# Patient Record
Sex: MALE | Race: OTHER | NOT HISPANIC OR LATINO | URBAN - METROPOLITAN AREA
[De-identification: names, ages, dates, MRNs, and addresses within clinical notes are randomized per-mention and may not be internally consistent; named-entity substitution may affect disease eponyms.]

---

## 2022-12-07 ENCOUNTER — INPATIENT (INPATIENT)
Facility: HOSPITAL | Age: 27
LOS: 7 days | Discharge: NURSING, REHAB OR LONG-TERM CARE | DRG: 897 | End: 2022-12-15
Attending: STUDENT IN AN ORGANIZED HEALTH CARE EDUCATION/TRAINING PROGRAM | Admitting: STUDENT IN AN ORGANIZED HEALTH CARE EDUCATION/TRAINING PROGRAM
Payer: MEDICAID

## 2022-12-07 VITALS
RESPIRATION RATE: 19 BRPM | WEIGHT: 238.98 LBS | DIASTOLIC BLOOD PRESSURE: 79 MMHG | SYSTOLIC BLOOD PRESSURE: 147 MMHG | TEMPERATURE: 98 F | HEART RATE: 119 BPM | OXYGEN SATURATION: 98 %

## 2022-12-07 DIAGNOSIS — F10.239 ALCOHOL DEPENDENCE WITH WITHDRAWAL, UNSPECIFIED: ICD-10-CM

## 2022-12-07 LAB
ALBUMIN SERPL ELPH-MCNC: 3.4 G/DL — LOW (ref 3.5–5)
ALP SERPL-CCNC: 72 U/L — SIGNIFICANT CHANGE UP (ref 40–120)
ALT FLD-CCNC: 329 U/L DA — HIGH (ref 10–60)
ANION GAP SERPL CALC-SCNC: 12 MMOL/L — SIGNIFICANT CHANGE UP (ref 5–17)
APAP SERPL-MCNC: <2 UG/ML — LOW (ref 10–30)
AST SERPL-CCNC: 206 U/L — HIGH (ref 10–40)
BASOPHILS # BLD AUTO: 0.01 K/UL — SIGNIFICANT CHANGE UP (ref 0–0.2)
BASOPHILS NFR BLD AUTO: 0.1 % — SIGNIFICANT CHANGE UP (ref 0–2)
BILIRUB SERPL-MCNC: 1 MG/DL — SIGNIFICANT CHANGE UP (ref 0.2–1.2)
BUN SERPL-MCNC: 11 MG/DL — SIGNIFICANT CHANGE UP (ref 7–18)
CALCIUM SERPL-MCNC: 9.5 MG/DL — SIGNIFICANT CHANGE UP (ref 8.4–10.5)
CHLORIDE SERPL-SCNC: 101 MMOL/L — SIGNIFICANT CHANGE UP (ref 96–108)
CO2 SERPL-SCNC: 25 MMOL/L — SIGNIFICANT CHANGE UP (ref 22–31)
CREAT SERPL-MCNC: 0.71 MG/DL — SIGNIFICANT CHANGE UP (ref 0.5–1.3)
EGFR: 127 ML/MIN/1.73M2 — SIGNIFICANT CHANGE UP
EOSINOPHIL # BLD AUTO: 0.03 K/UL — SIGNIFICANT CHANGE UP (ref 0–0.5)
EOSINOPHIL NFR BLD AUTO: 0.3 % — SIGNIFICANT CHANGE UP (ref 0–6)
ETHANOL SERPL-MCNC: <3 MG/DL — SIGNIFICANT CHANGE UP (ref 0–10)
FLUAV AG NPH QL: SIGNIFICANT CHANGE UP
FLUBV AG NPH QL: SIGNIFICANT CHANGE UP
GLUCOSE SERPL-MCNC: 97 MG/DL — SIGNIFICANT CHANGE UP (ref 70–99)
HCT VFR BLD CALC: 42.9 % — SIGNIFICANT CHANGE UP (ref 39–50)
HGB BLD-MCNC: 14.3 G/DL — SIGNIFICANT CHANGE UP (ref 13–17)
IMM GRANULOCYTES NFR BLD AUTO: 0.4 % — SIGNIFICANT CHANGE UP (ref 0–0.9)
LYMPHOCYTES # BLD AUTO: 1.22 K/UL — SIGNIFICANT CHANGE UP (ref 1–3.3)
LYMPHOCYTES # BLD AUTO: 12.7 % — LOW (ref 13–44)
MCHC RBC-ENTMCNC: 28.9 PG — SIGNIFICANT CHANGE UP (ref 27–34)
MCHC RBC-ENTMCNC: 33.3 GM/DL — SIGNIFICANT CHANGE UP (ref 32–36)
MCV RBC AUTO: 86.8 FL — SIGNIFICANT CHANGE UP (ref 80–100)
MONOCYTES # BLD AUTO: 0.67 K/UL — SIGNIFICANT CHANGE UP (ref 0–0.9)
MONOCYTES NFR BLD AUTO: 7 % — SIGNIFICANT CHANGE UP (ref 2–14)
NEUTROPHILS # BLD AUTO: 7.6 K/UL — HIGH (ref 1.8–7.4)
NEUTROPHILS NFR BLD AUTO: 79.5 % — HIGH (ref 43–77)
NRBC # BLD: 0 /100 WBCS — SIGNIFICANT CHANGE UP (ref 0–0)
PLATELET # BLD AUTO: 175 K/UL — SIGNIFICANT CHANGE UP (ref 150–400)
POTASSIUM SERPL-MCNC: 4.2 MMOL/L — SIGNIFICANT CHANGE UP (ref 3.5–5.3)
POTASSIUM SERPL-SCNC: 4.2 MMOL/L — SIGNIFICANT CHANGE UP (ref 3.5–5.3)
PROT SERPL-MCNC: 7.8 G/DL — SIGNIFICANT CHANGE UP (ref 6–8.3)
RBC # BLD: 4.94 M/UL — SIGNIFICANT CHANGE UP (ref 4.2–5.8)
RBC # FLD: 14.5 % — SIGNIFICANT CHANGE UP (ref 10.3–14.5)
SALICYLATES SERPL-MCNC: <1.7 MG/DL — LOW (ref 2.8–20)
SARS-COV-2 RNA SPEC QL NAA+PROBE: SIGNIFICANT CHANGE UP
SODIUM SERPL-SCNC: 138 MMOL/L — SIGNIFICANT CHANGE UP (ref 135–145)
WBC # BLD: 9.57 K/UL — SIGNIFICANT CHANGE UP (ref 3.8–10.5)
WBC # FLD AUTO: 9.57 K/UL — SIGNIFICANT CHANGE UP (ref 3.8–10.5)

## 2022-12-07 PROCEDURE — 71045 X-RAY EXAM CHEST 1 VIEW: CPT | Mod: 26

## 2022-12-07 PROCEDURE — 99285 EMERGENCY DEPT VISIT HI MDM: CPT

## 2022-12-07 RX ADMIN — Medication 2 MILLIGRAM(S): at 20:18

## 2022-12-07 NOTE — ED PROVIDER NOTE - NSTIMEPROVIDERCAREINITIATE_GEN_ER
radiology results/need for outpatient follow-up/return to ED if symptoms worsen, persist or questions arise
07-Dec-2022 18:55

## 2022-12-07 NOTE — ED PROVIDER NOTE - PSYCHIATRIC, MLM
Alert and oriented to person, place, time/situation. normal mood and affect. no apparent risk to self or others. Anxious.

## 2022-12-07 NOTE — ED PROVIDER NOTE - CLINICAL SUMMARY MEDICAL DECISION MAKING FREE TEXT BOX
27 year old male with history of alcohol dependence presents to the ED with complaints of chest pain and anxiety with "shakes" today. Suspicion for alcohol withdrawal. Will obtain EKG, labs, and provide IV Ativan, then reassess.

## 2022-12-07 NOTE — ED ADULT TRIAGE NOTE - NSWEIGHTCALCTOOLDRUG_GEN_A_CORE
81 y/o M with PMH of prostate cancer s/p seed implant, Hypertension, Type 2 diabetes on insulin (has not been taking insulin recently), CAD s/p stent (last stent placed 2 years ago), CVA s/p carotid stent 1 month ago, JACKELIN on CPAP, presents to the ED complaining of altered mental status and dysuria.   used

## 2022-12-07 NOTE — ED PROVIDER NOTE - ENMT, MLM
Airway patent, Nasal mucosa clear. Mouth with normal mucosa. Throat has no vesicles, no oropharyngeal exudates and uvula is midline. Tongue fasciculations.

## 2022-12-07 NOTE — ED PROVIDER NOTE - OBJECTIVE STATEMENT
27 year old male with history of alcohol dependence presents to the ED with complaints of chest pain and anxiety with "shakes" today. The patient reports that his last drink was one day ago. The patient notes that he had experienced withdrawal before. The patient denies drug use, and any other symptoms including suicidal or homicidal ideation, shortness of breath, vomiting, and diarrhea. NKDA.

## 2022-12-08 DIAGNOSIS — R74.01 ELEVATION OF LEVELS OF LIVER TRANSAMINASE LEVELS: ICD-10-CM

## 2022-12-08 DIAGNOSIS — Z29.9 ENCOUNTER FOR PROPHYLACTIC MEASURES, UNSPECIFIED: ICD-10-CM

## 2022-12-08 DIAGNOSIS — F10.10 ALCOHOL ABUSE, UNCOMPLICATED: ICD-10-CM

## 2022-12-08 LAB
A1C WITH ESTIMATED AVERAGE GLUCOSE RESULT: 5 % — SIGNIFICANT CHANGE UP (ref 4–5.6)
AMPHET UR-MCNC: NEGATIVE — SIGNIFICANT CHANGE UP
ANION GAP SERPL CALC-SCNC: 13 MMOL/L — SIGNIFICANT CHANGE UP (ref 5–17)
APPEARANCE UR: CLEAR — SIGNIFICANT CHANGE UP
BARBITURATES UR SCN-MCNC: NEGATIVE — SIGNIFICANT CHANGE UP
BENZODIAZ UR-MCNC: NEGATIVE — SIGNIFICANT CHANGE UP
BILIRUB UR-MCNC: NEGATIVE — SIGNIFICANT CHANGE UP
BUN SERPL-MCNC: 9 MG/DL — SIGNIFICANT CHANGE UP (ref 7–18)
CALCIUM SERPL-MCNC: 9.2 MG/DL — SIGNIFICANT CHANGE UP (ref 8.4–10.5)
CHLORIDE SERPL-SCNC: 104 MMOL/L — SIGNIFICANT CHANGE UP (ref 96–108)
CHOLEST SERPL-MCNC: 183 MG/DL — SIGNIFICANT CHANGE UP
CO2 SERPL-SCNC: 24 MMOL/L — SIGNIFICANT CHANGE UP (ref 22–31)
COCAINE METAB.OTHER UR-MCNC: NEGATIVE — SIGNIFICANT CHANGE UP
COLOR SPEC: YELLOW — SIGNIFICANT CHANGE UP
CREAT SERPL-MCNC: 0.59 MG/DL — SIGNIFICANT CHANGE UP (ref 0.5–1.3)
DIFF PNL FLD: NEGATIVE — SIGNIFICANT CHANGE UP
EGFR: 135 ML/MIN/1.73M2 — SIGNIFICANT CHANGE UP
ESTIMATED AVERAGE GLUCOSE: 97 MG/DL — SIGNIFICANT CHANGE UP (ref 68–114)
GLUCOSE SERPL-MCNC: 86 MG/DL — SIGNIFICANT CHANGE UP (ref 70–99)
GLUCOSE UR QL: NEGATIVE — SIGNIFICANT CHANGE UP
HCT VFR BLD CALC: 40.8 % — SIGNIFICANT CHANGE UP (ref 39–50)
HDLC SERPL-MCNC: 52 MG/DL — SIGNIFICANT CHANGE UP
HGB BLD-MCNC: 13.9 G/DL — SIGNIFICANT CHANGE UP (ref 13–17)
KETONES UR-MCNC: ABNORMAL
LEUKOCYTE ESTERASE UR-ACNC: NEGATIVE — SIGNIFICANT CHANGE UP
LIDOCAIN IGE QN: 273 U/L — SIGNIFICANT CHANGE UP (ref 73–393)
LIPID PNL WITH DIRECT LDL SERPL: 115 MG/DL — HIGH
MCHC RBC-ENTMCNC: 29.5 PG — SIGNIFICANT CHANGE UP (ref 27–34)
MCHC RBC-ENTMCNC: 34.1 GM/DL — SIGNIFICANT CHANGE UP (ref 32–36)
MCV RBC AUTO: 86.6 FL — SIGNIFICANT CHANGE UP (ref 80–100)
METHADONE UR-MCNC: NEGATIVE — SIGNIFICANT CHANGE UP
NITRITE UR-MCNC: NEGATIVE — SIGNIFICANT CHANGE UP
NON HDL CHOLESTEROL: 131 MG/DL — HIGH
NRBC # BLD: 0 /100 WBCS — SIGNIFICANT CHANGE UP (ref 0–0)
OPIATES UR-MCNC: NEGATIVE — SIGNIFICANT CHANGE UP
PCP SPEC-MCNC: SIGNIFICANT CHANGE UP
PCP UR-MCNC: NEGATIVE — SIGNIFICANT CHANGE UP
PH UR: 6 — SIGNIFICANT CHANGE UP (ref 5–8)
PLATELET # BLD AUTO: 156 K/UL — SIGNIFICANT CHANGE UP (ref 150–400)
POTASSIUM SERPL-MCNC: 3.7 MMOL/L — SIGNIFICANT CHANGE UP (ref 3.5–5.3)
POTASSIUM SERPL-SCNC: 3.7 MMOL/L — SIGNIFICANT CHANGE UP (ref 3.5–5.3)
PROT UR-MCNC: NEGATIVE — SIGNIFICANT CHANGE UP
RBC # BLD: 4.71 M/UL — SIGNIFICANT CHANGE UP (ref 4.2–5.8)
RBC # FLD: 14.4 % — SIGNIFICANT CHANGE UP (ref 10.3–14.5)
SODIUM SERPL-SCNC: 141 MMOL/L — SIGNIFICANT CHANGE UP (ref 135–145)
SP GR SPEC: 1.01 — SIGNIFICANT CHANGE UP (ref 1.01–1.02)
THC UR QL: NEGATIVE — SIGNIFICANT CHANGE UP
TRIGL SERPL-MCNC: 79 MG/DL — SIGNIFICANT CHANGE UP
UROBILINOGEN FLD QL: NEGATIVE — SIGNIFICANT CHANGE UP
WBC # BLD: 6.11 K/UL — SIGNIFICANT CHANGE UP (ref 3.8–10.5)
WBC # FLD AUTO: 6.11 K/UL — SIGNIFICANT CHANGE UP (ref 3.8–10.5)

## 2022-12-08 PROCEDURE — 99223 1ST HOSP IP/OBS HIGH 75: CPT | Mod: GC

## 2022-12-08 PROCEDURE — 76705 ECHO EXAM OF ABDOMEN: CPT | Mod: 26

## 2022-12-08 RX ORDER — ACETAMINOPHEN 500 MG
650 TABLET ORAL EVERY 6 HOURS
Refills: 0 | Status: DISCONTINUED | OUTPATIENT
Start: 2022-12-08 | End: 2022-12-15

## 2022-12-08 RX ORDER — FOLIC ACID 0.8 MG
1 TABLET ORAL DAILY
Refills: 0 | Status: DISCONTINUED | OUTPATIENT
Start: 2022-12-08 | End: 2022-12-15

## 2022-12-08 RX ORDER — ONDANSETRON 8 MG/1
4 TABLET, FILM COATED ORAL EVERY 8 HOURS
Refills: 0 | Status: DISCONTINUED | OUTPATIENT
Start: 2022-12-08 | End: 2022-12-15

## 2022-12-08 RX ORDER — LANOLIN ALCOHOL/MO/W.PET/CERES
3 CREAM (GRAM) TOPICAL AT BEDTIME
Refills: 0 | Status: DISCONTINUED | OUTPATIENT
Start: 2022-12-08 | End: 2022-12-15

## 2022-12-08 RX ORDER — THIAMINE MONONITRATE (VIT B1) 100 MG
500 TABLET ORAL EVERY 8 HOURS
Refills: 0 | Status: COMPLETED | OUTPATIENT
Start: 2022-12-08 | End: 2022-12-10

## 2022-12-08 RX ADMIN — Medication 105 MILLIGRAM(S): at 05:02

## 2022-12-08 RX ADMIN — Medication 2 MILLIGRAM(S): at 11:15

## 2022-12-08 RX ADMIN — Medication 1 TABLET(S): at 11:14

## 2022-12-08 RX ADMIN — Medication 105 MILLIGRAM(S): at 22:01

## 2022-12-08 RX ADMIN — Medication 1 MILLIGRAM(S): at 11:14

## 2022-12-08 RX ADMIN — Medication 105 MILLIGRAM(S): at 15:42

## 2022-12-08 NOTE — PATIENT PROFILE ADULT - FALL HARM RISK - HARM RISK INTERVENTIONS
Assistance with ambulation/Assistance OOB with selected safe patient handling equipment/Communicate Risk of Fall with Harm to all staff/Monitor for mental status changes/Monitor gait and stability/Reinforce activity limits and safety measures with patient and family/Tailored Fall Risk Interventions/Toileting schedule using arm’s reach rule for commode and bathroom/Use of alarms - bed, chair and/or voice tab/Visual Cue: Yellow wristband and red socks/Bed in lowest position, wheels locked, appropriate side rails in place/Call bell, personal items and telephone in reach/Instruct patient to call for assistance before getting out of bed or chair/Non-slip footwear when patient is out of bed/[place] to call system/Physically safe environment - no spills, clutter or unnecessary equipment/Purposeful Proactive Rounding/Room/bathroom lighting operational, light cord in reach

## 2022-12-08 NOTE — H&P ADULT - HISTORY OF PRESENT ILLNESS
This is a 27 year old male, coming from home, with medical history of alcohol abuse coming in for alcohol intoxication. Pt  This is a 27 year old male, coming from home, with medical history of alcohol abuse coming in for alcohol intoxication. Pt presented to the ED with complaints of chest pain, anxiety and shaking. Pt states these symptoms feel similar to the last time he went into withdrawl from alchol use. His last drink was over 24 hours ago, he is unable to quantify how much he drinks daily. Unable to obtain more information as pt was given Ativan and was drowsy.

## 2022-12-08 NOTE — H&P ADULT - ASSESSMENT
This is a 27 year old male, coming from home, with medical history of alcohol abuse coming in for alcohol intoxication.

## 2022-12-08 NOTE — H&P ADULT - ATTENDING COMMENTS
Vital Signs Last 24 Hrs  T(C): 36.7 (08 Dec 2022 00:00), Max: 37.2 (07 Dec 2022 21:17)  T(F): 98.1 (08 Dec 2022 00:00), Max: 98.9 (07 Dec 2022 21:17)  HR: 111 (08 Dec 2022 00:00) (106 - 119)  BP: 126/79 (08 Dec 2022 00:00) (107/71 - 147/79)  BP(mean): --  RR: 18 (08 Dec 2022 00:00) (18 - 19)  SpO2: 99% (08 Dec 2022 00:00) (98% - 99%)    Parameters below as of 08 Dec 2022 00:00  Patient On (Oxygen Delivery Method): room air    Labs and imaging studies noted    Assessment and plan: Patient is a 28 yo male, Luis speaking, from home, with PMH of alcohol abuse coming in for alcohol intoxication. Pt presented to the ED with complaints of chest pain, anxiety and shaking. Pt states these symptoms feel similar to the last time he went into withdrawal from alchol use. His last drink was over 2 days ago, reports that he drinks heavily but has been trying to cut etoh intake since last 1 week. Reports being hospitalized before for etoh withdrawal. Never been in etoh rehab program, but is interested to participate as he is willing to quit alcohol.      Vital Signs Last 24 Hrs  T(C): 36.7 (08 Dec 2022 00:00), Max: 37.2 (07 Dec 2022 21:17)  T(F): 98.1 (08 Dec 2022 00:00), Max: 98.9 (07 Dec 2022 21:17)  HR: 111 (08 Dec 2022 00:00) (106 - 119)  BP: 126/79 (08 Dec 2022 00:00) (107/71 - 147/79)  BP(mean): --  RR: 18 (08 Dec 2022 00:00) (18 - 19)  SpO2: 99% (08 Dec 2022 00:00) (98% - 99%)  Parameters below as of 08 Dec 2022 00:00  Patient On (Oxygen Delivery Method): room air  AAOx3, NAD, mild hand tremors    Labs and imaging studies noted    Assessment and plan: Patient is a 26 yo male, Luis speaking, from home, with PMH of alcohol abuse coming in for alcohol intoxication. Pt presented to the ED with complaints of chest pain, anxiety and shaking. Pt states these symptoms feel similar to the last time he went into withdrawal from alchol use. His last drink was over 2 days ago, reports that he drinks heavily but has been trying to cut etoh intake since last 1 week. Reports being hospitalized before for etoh withdrawal. Never been in etoh rehab program, but is interested to participate as he is willing to quit alcohol.      Vital Signs Last 24 Hrs  T(C): 36.7 (08 Dec 2022 00:00), Max: 37.2 (07 Dec 2022 21:17)  T(F): 98.1 (08 Dec 2022 00:00), Max: 98.9 (07 Dec 2022 21:17)  HR: 111 (08 Dec 2022 00:00) (106 - 119)  BP: 126/79 (08 Dec 2022 00:00) (107/71 - 147/79)  BP(mean): --  RR: 18 (08 Dec 2022 00:00) (18 - 19)  SpO2: 99% (08 Dec 2022 00:00) (98% - 99%)  Parameters below as of 08 Dec 2022 00:00  Patient On (Oxygen Delivery Method): room air  AAOx3, NAD, mild hand tremors on extension  chest soft, nt, nd  no FND  no rash, lesion    Labs and imaging studies noted    Assessment and plan: Patient is a 28 yo male, Luis speaking, from home, with PMH of alcohol abuse coming in for alcohol intoxication. Pt presented to the ED with complaints of chest pain, anxiety and shaking. Pt states these symptoms feel similar to the last time he went into withdrawal from alchol use. His last drink was over 2 days ago, reports that he drinks heavily but has been trying to cut etoh intake since last 1 week. Reports being hospitalized before for etoh withdrawal. Never been in etoh rehab program, but is interested to participate as he is willing to quit alcohol.      Vital Signs Last 24 Hrs  T(C): 36.7 (08 Dec 2022 00:00), Max: 37.2 (07 Dec 2022 21:17)  T(F): 98.1 (08 Dec 2022 00:00), Max: 98.9 (07 Dec 2022 21:17)  HR: 111 (08 Dec 2022 00:00) (106 - 119)  BP: 126/79 (08 Dec 2022 00:00) (107/71 - 147/79)  BP(mean): --  RR: 18 (08 Dec 2022 00:00) (18 - 19)  SpO2: 99% (08 Dec 2022 00:00) (98% - 99%)  Parameters below as of 08 Dec 2022 00:00  Patient On (Oxygen Delivery Method): room air  AAOx3, NAD, mild hand tremors on extension  chest soft, nt, nd  no FND  no rash, lesion    Labs and imaging studies noted  BAL< 3, AST//329  s/p 2 mg iv ativan in ED    Assessment and plan: 28 yo male, Luis speaking, from home, with PMH of alcohol abuse coming in for alcohol intoxication/withdrawal.     Etoh abuse/withdrawal  Transaminitis    - p/e anxiety, chest pain, shaking, CIWA 14 in ED, s/p iv ativan 2 mg  - last drink 2 days ago, reports he has been trying to cut alcohol last1 week  - usually drinks 1/2 bottle of whiskey/ day.  - BAL <3, transaminitis likely ETOH induced  - reports burning epigastric pain, likely etoh induced gastritis  - CIWA protocol, Ativan 2mg q4 standing + 2mg q2 PRN, iv thiamine folate replacement  - po pantoprazole  - RUQ US abdomen  - patient interested in etoh rehab program  - SW consult

## 2022-12-08 NOTE — H&P ADULT - NSHPREVIEWOFSYSTEMS_GEN_ALL_CORE
CONSTITUTIONAL: No fever, weight loss, or fatigue  RESPIRATORY: No cough, wheezing, chills or hemoptysis; No shortness of breath  CARDIOVASCULAR: No chest pain, palpitations, dizziness, or leg swelling  GASTROINTESTINAL: No abdominal pain. No nausea, vomiting, or hematemesis; No diarrhea or constipation. No melena or hematochezia.  GENITOURINARY: No dysuria or hematuria, urinary frequency  NEUROLOGICAL: No headaches, memory loss, loss of strength, numbness, or tremors  ENDOCRINE: No polyuria, polydipsia, or heat/cold intolerance  MUSCULOSKELETAL: No muscle aches, joint pains  HEME: no easy bruisability, no tender or enlarged lymph nodes  SKIN: No itching, burning, rashes, or lesions . CONSTITUTIONAL: No fever, weight loss, or fatigue  RESPIRATORY: No cough, wheezing, chills or hemoptysis; No shortness of breath  CARDIOVASCULAR: No chest pain, palpitations, dizziness, or leg swelling  GASTROINTESTINAL: No abdominal pain. No nausea, vomiting, or hematemesis; No diarrhea or constipation. No melena or hematochezia.  GENITOURINARY: No dysuria or hematuria, urinary frequency  NEUROLOGICAL: Anxiety  ENDOCRINE: No polyuria, polydipsia, or heat/cold intolerance  MUSCULOSKELETAL: No muscle aches, joint pains  HEME: no easy bruisability, no tender or enlarged lymph nodes  SKIN: No itching, burning, rashes, or lesions .

## 2022-12-08 NOTE — H&P ADULT - NSHPPHYSICALEXAM_GEN_ALL_CORE
Vital Signs Last 24 Hrs  T(C): 37.2 (07 Dec 2022 21:17), Max: 37.2 (07 Dec 2022 21:17)  T(F): 98.9 (07 Dec 2022 21:17), Max: 98.9 (07 Dec 2022 21:17)  HR: 106 (07 Dec 2022 21:17) (106 - 119)  BP: 107/71 (07 Dec 2022 21:17) (107/71 - 147/79)  BP(mean): --  RR: 18 (07 Dec 2022 21:17) (18 - 19)  SpO2: 99% (07 Dec 2022 21:17) (98% - 99%)    Parameters below as of 07 Dec 2022 21:17  Patient On (Oxygen Delivery Method): room air    PHYSICAL EXAM:  GENERAL: NAD, speaks in full sentences, no signs of respiratory distress  HEAD:  Atraumatic, Normocephalic  EYES: EOMI, PERRLA, conjunctiva and sclera clear  NECK: Supple, No JVD  CHEST/LUNG: Clear to auscultation bilaterally; No wheeze; No crackles; No accessory muscles used  HEART: Regular rate and rhythm; No murmurs;   ABDOMEN: Soft, Nontender, Nondistended; Bowel sounds present; No guarding  EXTREMITIES:  2+ Peripheral Pulses, No cyanosis or edema  PSYCH: AAOx3  NEUROLOGY: non-focal  SKIN: No rashes or lesions Vital Signs Last 24 Hrs  T(C): 37.2 (07 Dec 2022 21:17), Max: 37.2 (07 Dec 2022 21:17)  T(F): 98.9 (07 Dec 2022 21:17), Max: 98.9 (07 Dec 2022 21:17)  HR: 106 (07 Dec 2022 21:17) (106 - 119)  BP: 107/71 (07 Dec 2022 21:17) (107/71 - 147/79)  BP(mean): --  RR: 18 (07 Dec 2022 21:17) (18 - 19)  SpO2: 99% (07 Dec 2022 21:17) (98% - 99%)    Parameters below as of 07 Dec 2022 21:17  Patient On (Oxygen Delivery Method): room air    PHYSICAL EXAM:  GENERAL: Drowsy  HEAD:  Atraumatic, Normocephalic  EYES: EOMI, PERRLA, conjunctiva and sclera clear  NECK: Supple, No JVD  CHEST/LUNG: Clear to auscultation bilaterally; No wheeze; No crackles; No accessory muscles used  HEART: Regular rate and rhythm; No murmurs;   ABDOMEN: Soft, Nontender, Nondistended; Bowel sounds present; No guarding  EXTREMITIES:  2+ Peripheral Pulses, No cyanosis or edema  PSYCH: AAOx3  NEUROLOGY: non-focal  SKIN: No rashes or lesions

## 2022-12-08 NOTE — H&P ADULT - PROBLEM SELECTOR PLAN 1
Pt drinks ____  daily  BAL ____ on admission  Start CIWA Protocol  Ativan 2mg q4 standing + 2mg q2 PRN for CIWA >7  Thiamine 500mg q8 x3 days, multivitamin, b12 qd, b9 qd  SBIRT Pt drinks daily  BAL <3 on admission  Start CIWA Protocol  Ativan 2mg q4 standing + 2mg q2 PRN for CIWA >7  Thiamine 500mg q8 x3 days, multivitamin, b12 qd, b9 qd  SBIRT

## 2022-12-09 DIAGNOSIS — Z02.9 ENCOUNTER FOR ADMINISTRATIVE EXAMINATIONS, UNSPECIFIED: ICD-10-CM

## 2022-12-09 PROCEDURE — 99233 SBSQ HOSP IP/OBS HIGH 50: CPT

## 2022-12-09 RX ORDER — SIMETHICONE 80 MG/1
80 TABLET, CHEWABLE ORAL THREE TIMES A DAY
Refills: 0 | Status: DISCONTINUED | OUTPATIENT
Start: 2022-12-09 | End: 2022-12-15

## 2022-12-09 RX ADMIN — Medication 105 MILLIGRAM(S): at 23:13

## 2022-12-09 RX ADMIN — SIMETHICONE 80 MILLIGRAM(S): 80 TABLET, CHEWABLE ORAL at 14:10

## 2022-12-09 RX ADMIN — Medication 1 MILLIGRAM(S): at 13:19

## 2022-12-09 RX ADMIN — Medication 105 MILLIGRAM(S): at 14:10

## 2022-12-09 RX ADMIN — Medication 105 MILLIGRAM(S): at 05:54

## 2022-12-09 RX ADMIN — Medication 1 TABLET(S): at 13:19

## 2022-12-09 NOTE — PROGRESS NOTE ADULT - SUBJECTIVE AND OBJECTIVE BOX
NP Note discussed with  Primary Attending    Patient is a 27y old  Male who presents with a chief complaint of Alcohol intoxication (08 Dec 2022 00:17)      INTERVAL HPI/OVERNIGHT EVENTS: no new complaints    MEDICATIONS  (STANDING):  folic acid 1 milliGRAM(s) Oral daily  multivitamin 1 Tablet(s) Oral daily  thiamine IVPB 500 milliGRAM(s) IV Intermittent every 8 hours    MEDICATIONS  (PRN):  acetaminophen     Tablet .. 650 milliGRAM(s) Oral every 6 hours PRN Temp greater or equal to 38C (100.4F), Mild Pain (1 - 3)  aluminum hydroxide/magnesium hydroxide/simethicone Suspension 30 milliLiter(s) Oral every 4 hours PRN Dyspepsia  LORazepam     Tablet 2 milliGRAM(s) Oral every 4 hours PRN CIWA-Ar score 8 or greater  LORazepam   Injectable 2 milliGRAM(s) IV Push every 2 hours PRN CIWA-Ar score increase by 2 points and a total score of 7 or less  melatonin 3 milliGRAM(s) Oral at bedtime PRN Insomnia  ondansetron Injectable 4 milliGRAM(s) IV Push every 8 hours PRN Nausea and/or Vomiting  simethicone 80 milliGRAM(s) Chew three times a day PRN Gas      __________________________________________________  REVIEW OF SYSTEMS:    CONSTITUTIONAL: No fever,   EYES: no acute visual disturbances  NECK: No pain or stiffness  RESPIRATORY: No cough; No shortness of breath  CARDIOVASCULAR: No chest pain, no palpitations  GASTROINTESTINAL: No pain. No nausea or vomiting; No diarrhea   NEUROLOGICAL: No headache or numbness, no tremors  MUSCULOSKELETAL: No joint pain, no muscle pain  GENITOURINARY: no dysuria, no frequency, no hesitancy  PSYCHIATRY: no depression , no anxiety  ALL OTHER  ROS negative        Vital Signs Last 24 Hrs  T(C): 37.2 (09 Dec 2022 14:00), Max: 37.2 (09 Dec 2022 14:00)  T(F): 98.9 (09 Dec 2022 14:00), Max: 98.9 (09 Dec 2022 14:00)  HR: 108 (09 Dec 2022 14:00) (89 - 114)  BP: 145/84 (09 Dec 2022 14:00) (116/71 - 145/84)  BP(mean): 99 (09 Dec 2022 14:00) (99 - 99)  RR: 18 (09 Dec 2022 14:00) (18 - 18)  SpO2: 98% (09 Dec 2022 14:00) (98% - 100%)    Parameters below as of 09 Dec 2022 14:00  Patient On (Oxygen Delivery Method): room air        ________________________________________________  PHYSICAL EXAM:  GENERAL: NAD  HEENT: Normocephalic;  conjunctivae and sclerae clear; moist mucous membranes;   NECK : supple  CHEST/LUNG: Clear to auscultation bilaterally with good air entry   HEART: S1 S2  regular; no murmurs, gallops or rubs  ABDOMEN: Soft, Nontender, Nondistended; Bowel sounds present  EXTREMITIES: no cyanosis; no edema; no calf tenderness  SKIN: warm and dry; no rash  NERVOUS SYSTEM:  Awake and alert; Oriented  to place, person and time ; no new deficits    _________________________________________________  LABS:                        13.9   6.11  )-----------( 156      ( 08 Dec 2022 05:40 )             40.8     12-    141  |  104  |  9   ----------------------------<  86  3.7   |  24  |  0.59    Ca    9.2      08 Dec 2022 05:40    TPro  7.8  /  Alb  3.4<L>  /  TBili  1.0  /  DBili  x   /  AST  206<H>  /  ALT  329<H>  /  AlkPhos  72  12-07      Urinalysis Basic - ( 08 Dec 2022 20:50 )    Color: Yellow / Appearance: Clear / S.015 / pH: x  Gluc: x / Ketone: Trace  / Bili: Negative / Urobili: Negative   Blood: x / Protein: Negative / Nitrite: Negative   Leuk Esterase: Negative / RBC: x / WBC x   Sq Epi: x / Non Sq Epi: x / Bacteria: x      CAPILLARY BLOOD GLUCOSE            RADIOLOGY & ADDITIONAL TESTS:    Imaging Personally Reviewed:  YES/NO    Consultant(s) Notes Reviewed:   YES/ No    Care Discussed with Consultants :     Plan of care was discussed with patient and /or primary care giver; all questions and concerns were addressed and care was aligned with patient's wishes.     NP Note discussed with  Primary Attending    Patient is a 27y old  Male who presents with a chief complaint of Alcohol intoxication (08 Dec 2022 00:17)      INTERVAL HPI/OVERNIGHT EVENTS: no acute events overnight    MEDICATIONS  (STANDING):  folic acid 1 milliGRAM(s) Oral daily  multivitamin 1 Tablet(s) Oral daily  thiamine IVPB 500 milliGRAM(s) IV Intermittent every 8 hours    MEDICATIONS  (PRN):  acetaminophen     Tablet .. 650 milliGRAM(s) Oral every 6 hours PRN Temp greater or equal to 38C (100.4F), Mild Pain (1 - 3)  aluminum hydroxide/magnesium hydroxide/simethicone Suspension 30 milliLiter(s) Oral every 4 hours PRN Dyspepsia  LORazepam     Tablet 2 milliGRAM(s) Oral every 4 hours PRN CIWA-Ar score 8 or greater  LORazepam   Injectable 2 milliGRAM(s) IV Push every 2 hours PRN CIWA-Ar score increase by 2 points and a total score of 7 or less  melatonin 3 milliGRAM(s) Oral at bedtime PRN Insomnia  ondansetron Injectable 4 milliGRAM(s) IV Push every 8 hours PRN Nausea and/or Vomiting  simethicone 80 milliGRAM(s) Chew three times a day PRN Gas      __________________________________________________  REVIEW OF SYSTEMS:    CONSTITUTIONAL: No fever,   EYES: no acute visual disturbances  NECK: No pain or stiffness  RESPIRATORY: No cough; No shortness of breath  CARDIOVASCULAR: No chest pain, no palpitations  GASTROINTESTINAL: No pain. No nausea or vomiting; No diarrhea   NEUROLOGICAL: No headache or numbness, no tremors  MUSCULOSKELETAL: No joint pain, no muscle pain  GENITOURINARY: no dysuria, no frequency, no hesitancy  PSYCHIATRY: no depression , no anxiety  ALL OTHER  ROS negative        Vital Signs Last 24 Hrs  T(C): 37.2 (09 Dec 2022 14:00), Max: 37.2 (09 Dec 2022 14:00)  T(F): 98.9 (09 Dec 2022 14:00), Max: 98.9 (09 Dec 2022 14:00)  HR: 108 (09 Dec 2022 14:00) (89 - 114)  BP: 145/84 (09 Dec 2022 14:00) (116/71 - 145/84)  BP(mean): 99 (09 Dec 2022 14:00) (99 - 99)  RR: 18 (09 Dec 2022 14:00) (18 - 18)  SpO2: 98% (09 Dec 2022 14:00) (98% - 100%)    Parameters below as of 09 Dec 2022 14:00  Patient On (Oxygen Delivery Method): room air        ________________________________________________  PHYSICAL EXAM:  GENERAL: NAD  HEENT: Normocephalic;  conjunctivae and sclerae clear; moist mucous membranes;   NECK : supple  CHEST/LUNG: Clear to auscultation bilaterally with good air entry   HEART: S1 S2  regular; no murmurs, gallops or rubs  ABDOMEN: Soft, Nontender, Nondistended; Bowel sounds present  EXTREMITIES: no cyanosis; no edema; no calf tenderness  SKIN: warm and dry; no rash  NERVOUS SYSTEM:  Awake and alert; Oriented  to place, person and time ; no new deficits    _________________________________________________  LABS:                        13.9   6.11  )-----------( 156      ( 08 Dec 2022 05:40 )             40.8     12    141  |  104  |  9   ----------------------------<  86  3.7   |  24  |  0.59    Ca    9.2      08 Dec 2022 05:40    TPro  7.8  /  Alb  3.4<L>  /  TBili  1.0  /  DBili  x   /  AST  206<H>  /  ALT  329<H>  /  AlkPhos  72  12-07      Urinalysis Basic - ( 08 Dec 2022 20:50 )    Color: Yellow / Appearance: Clear / S.015 / pH: x  Gluc: x / Ketone: Trace  / Bili: Negative / Urobili: Negative   Blood: x / Protein: Negative / Nitrite: Negative   Leuk Esterase: Negative / RBC: x / WBC x   Sq Epi: x / Non Sq Epi: x / Bacteria: x      CAPILLARY BLOOD GLUCOSE            RADIOLOGY & ADDITIONAL TESTS:    Imaging Personally Reviewed:  YES/NO    Consultant(s) Notes Reviewed:   YES/ No    Care Discussed with Consultants :     Plan of care was discussed with patient and /or primary care giver; all questions and concerns were addressed and care was aligned with patient's wishes.     NP Note discussed with  Primary Attending    Patient is a 27y old  Male who presents with a chief complaint of Alcohol intoxication (08 Dec 2022 00:17)      INTERVAL HPI/OVERNIGHT EVENTS: no acute events overnight    MEDICATIONS  (STANDING):  folic acid 1 milliGRAM(s) Oral daily  multivitamin 1 Tablet(s) Oral daily  thiamine IVPB 500 milliGRAM(s) IV Intermittent every 8 hours    MEDICATIONS  (PRN):  acetaminophen     Tablet .. 650 milliGRAM(s) Oral every 6 hours PRN Temp greater or equal to 38C (100.4F), Mild Pain (1 - 3)  aluminum hydroxide/magnesium hydroxide/simethicone Suspension 30 milliLiter(s) Oral every 4 hours PRN Dyspepsia  LORazepam     Tablet 2 milliGRAM(s) Oral every 4 hours PRN CIWA-Ar score 8 or greater  LORazepam   Injectable 2 milliGRAM(s) IV Push every 2 hours PRN CIWA-Ar score increase by 2 points and a total score of 7 or less  melatonin 3 milliGRAM(s) Oral at bedtime PRN Insomnia  ondansetron Injectable 4 milliGRAM(s) IV Push every 8 hours PRN Nausea and/or Vomiting  simethicone 80 milliGRAM(s) Chew three times a day PRN Gas      __________________________________________________  REVIEW OF SYSTEMS:    CONSTITUTIONAL: No fever,   EYES: no acute visual disturbances  NECK: No pain or stiffness  RESPIRATORY: No cough; No shortness of breath  CARDIOVASCULAR: No chest pain, no palpitations  GASTROINTESTINAL: No pain. No nausea or vomiting; No diarrhea   NEUROLOGICAL: No headache or numbness, no tremors  MUSCULOSKELETAL: No joint pain, no muscle pain  GENITOURINARY: no dysuria, no frequency, no hesitancy  PSYCHIATRY: no depression , no anxiety  ALL OTHER  ROS negative        Vital Signs Last 24 Hrs  T(C): 37.2 (09 Dec 2022 14:00), Max: 37.2 (09 Dec 2022 14:00)  T(F): 98.9 (09 Dec 2022 14:00), Max: 98.9 (09 Dec 2022 14:00)  HR: 108 (09 Dec 2022 14:00) (89 - 114)  BP: 145/84 (09 Dec 2022 14:00) (116/71 - 145/84)  BP(mean): 99 (09 Dec 2022 14:00) (99 - 99)  RR: 18 (09 Dec 2022 14:00) (18 - 18)  SpO2: 98% (09 Dec 2022 14:00) (98% - 100%)    Parameters below as of 09 Dec 2022 14:00  Patient On (Oxygen Delivery Method): room air        ________________________________________________  PHYSICAL EXAM:  GENERAL: NAD; + nhan speaking  HEENT: Normocephalic;  conjunctivae and sclerae clear  NECK : supple  CHEST/LUNG: Clear to auscultation bilaterally  HEART: S1 S2  RRR  ABDOMEN: Soft, Nontender, Nondistended; Bowel sounds present  EXTREMITIES: no cyanosis; no edema  SKIN: warm and dry; no rash  NERVOUS SYSTEM:  Awake and alert; Oriented  to place, person and time    _________________________________________________  LABS:                        13.9   6.11  )-----------( 156      ( 08 Dec 2022 05:40 )             40.8     12-    141  |  104  |  9   ----------------------------<  86  3.7   |  24  |  0.59    Ca    9.2      08 Dec 2022 05:40    TPro  7.8  /  Alb  3.4<L>  /  TBili  1.0  /  DBili  x   /  AST  206<H>  /  ALT  329<H>  /  AlkPhos  72  1207      Urinalysis Basic - ( 08 Dec 2022 20:50 )    Color: Yellow / Appearance: Clear / S.015 / pH: x  Gluc: x / Ketone: Trace  / Bili: Negative / Urobili: Negative   Blood: x / Protein: Negative / Nitrite: Negative   Leuk Esterase: Negative / RBC: x / WBC x   Sq Epi: x / Non Sq Epi: x / Bacteria: x      CAPILLARY BLOOD GLUCOSE            RADIOLOGY & ADDITIONAL TESTS:  < from: US Abdomen Upper Quadrant Right (22 @ 12:29) >  IMPRESSION:  Fatty infiltration of the liver    < end of copied text >      < from: Xray Chest 1 View- PORTABLE-Urgent (22 @ 21:38) >  IMPRESSION:   No radiographic evidence of active chest disease.    < end of copied text >      Imaging Personally Reviewed:  YES/NO    Consultant(s) Notes Reviewed:   YES/ No    Care Discussed with Consultants :     Plan of care was discussed with patient and /or primary care giver; all questions and concerns were addressed and care was aligned with patient's wishes.

## 2022-12-09 NOTE — PROGRESS NOTE ADULT - ATTENDING COMMENTS
Examined via [facility]  Venkat #[id number]. Pt feels anxious and agitated, a bit shaky on the hands. Pt states he is homeless.    26 yo male, Luis speaking, homeless, with PMH of alcohol abuse coming in for alcohol intoxication/withdrawal.     < from: US Abdomen Upper Quadrant Right (12.08.22 @ 12:29) >    FINDINGS:  Liver: Echogenic suggestive of fatty infiltration  Bile ducts: Normal caliber. Common bile duct measures 3 mm.  Gallbladder: Within normal limits.  Pancreas: Not well seen  Right kidney: 12.8 cm. No hydronephrosis.  Ascites: None.  IVC: Visualized portions are within normal limits.    IMPRESSION:  Fatty infiltration of the liver    < end of copied text >      A/P;    #Etoh abuse/withdrawal  #Fatty liver  #Elevated liver enzyme  #HLD  #homeless    - On PRN ativan. CIWA 2 this morning.   - last drink 2 days before admission, drinks 1/2 bottle of whiskey/ day. Pt is willing to stop alcohol. f/u SW    - reports burning epigastric pain, likely etoh induced gastritis  - CIWA protocol, Ativan 2mg q4 standing + 2mg q2 PRN, iv thiamine folate replacement  - US abd : fatty liver. diet modification and weight loss  - Dash diet  - Pt states he is homeless. SW consulted

## 2022-12-09 NOTE — PROGRESS NOTE ADULT - NS ATTEND AMEND GEN_ALL_CORE FT
Examined via [facility]  Venkat #[id number]. Pt feels anxious and agitated, a bit shaky on the hands. Pt states he is homeless.    26 yo male, Luis speaking, homeless, with PMH of alcohol abuse coming in for alcohol intoxication/withdrawal.     < from: US Abdomen Upper Quadrant Right (12.08.22 @ 12:29) >    FINDINGS:  Liver: Echogenic suggestive of fatty infiltration  Bile ducts: Normal caliber. Common bile duct measures 3 mm.  Gallbladder: Within normal limits.  Pancreas: Not well seen  Right kidney: 12.8 cm. No hydronephrosis.  Ascites: None.  IVC: Visualized portions are within normal limits.    IMPRESSION:  Fatty infiltration of the liver    < end of copied text >      A/P;    #Etoh abuse/withdrawal  #Fatty liver  #Elevated liver enzyme  #HLD  #homeless    - On PRN ativan. CIWA 2 this morning.   - last drink 2 days before admission, drinks 1/2 bottle of whiskey/ day. Pt is willing to stop alcohol. f/u SW    - reports burning epigastric pain, likely etoh induced gastritis  - CIWA protocol, Ativan 2mg q4 standing + 2mg q2 PRN, iv thiamine folate replacement  - US abd : fatty liver. diet modification and weight loss  - Dash diet  - Pt states he is homeless. SW consulted

## 2022-12-09 NOTE — PHYSICAL THERAPY INITIAL EVALUATION ADULT - PERTINENT HX OF CURRENT PROBLEM, REHAB EVAL
Pt admitted for ETOH withdrawl.  Pt has been in US for relatively short period of time.  Has been abusing alcohol approx 1/2 bottle of gin daily. Pt comes in with alcohol withdrawl symtptoms including chest pain anxiety and shakiness

## 2022-12-09 NOTE — PHYSICAL THERAPY INITIAL EVALUATION ADULT - DIAGNOSIS, PT EVAL
Pt on ETOH abuse withdrawl however at this point in time is not demonstrating any physical impairment or disability.

## 2022-12-09 NOTE — PHYSICAL THERAPY INITIAL EVALUATION ADULT - NSACTIVITYREC_GEN_A_PT
Pt should be encouraged to be OOB and into chair instead of lying in bed. Pt can ambulate in room and walk to BR indep.

## 2022-12-09 NOTE — PHYSICAL THERAPY INITIAL EVALUATION ADULT - GENERAL OBSERVATIONS, REHAB EVAL
pt found lying in bed in NAD, A&Ox3. RASS scale =0 Pt is able to understand and express self pretty well in English ( does not want  translation)

## 2022-12-09 NOTE — SBIRT NOTE ADULT - NSSBIRTBRIEFINTDET_GEN_A_CORE
Patient is open treatment but identified his lack of insurance as a barrier. SW explained to patient that he can go to for help to a [facility] hospital such as [facility] or [facility]. Patient seemed in agreement but reluctant.

## 2022-12-10 PROCEDURE — 99233 SBSQ HOSP IP/OBS HIGH 50: CPT

## 2022-12-10 RX ADMIN — Medication 650 MILLIGRAM(S): at 20:01

## 2022-12-10 RX ADMIN — Medication 105 MILLIGRAM(S): at 21:27

## 2022-12-10 RX ADMIN — Medication 105 MILLIGRAM(S): at 13:35

## 2022-12-10 RX ADMIN — Medication 650 MILLIGRAM(S): at 08:43

## 2022-12-10 RX ADMIN — Medication 105 MILLIGRAM(S): at 06:14

## 2022-12-10 RX ADMIN — Medication 650 MILLIGRAM(S): at 09:13

## 2022-12-10 RX ADMIN — Medication 1 TABLET(S): at 11:48

## 2022-12-10 RX ADMIN — ONDANSETRON 4 MILLIGRAM(S): 8 TABLET, FILM COATED ORAL at 11:50

## 2022-12-10 RX ADMIN — Medication 1 MILLIGRAM(S): at 11:50

## 2022-12-10 RX ADMIN — Medication 650 MILLIGRAM(S): at 20:31

## 2022-12-10 NOTE — PROGRESS NOTE ADULT - SUBJECTIVE AND OBJECTIVE BOX
Interval of present illness: No acute events overnight. Pt seen at bedside. No complaints.    REVIEW OF SYSTEMS:    CONSTITUTIONAL: No fever  EYES: No acute visual disturbances  NECK: No pain or stiffness  RESPIRATORY: No cough; No shortness of breath  CARDIOVASCULAR: No chest pain, no palpitations  GASTROINTESTINAL: No pain. No nausea or vomiting.  No diarrhea   NEUROLOGICAL: No headache or numbness, no tremors  MUSCULOSKELETAL: no muscle pain  GENITOURINARY: No dysuria, no frequency, no hesitancy  PSYCHIATRY: No depression , no anxiety  ALL OTHER  ROS negative     O:  Vital Signs Last 24 Hrs  T(C): 37 (10 Dec 2022 06:00), Max: 37.2 (09 Dec 2022 14:00)  T(F): 98.6 (10 Dec 2022 06:00), Max: 98.9 (09 Dec 2022 14:00)  HR: 92 (10 Dec 2022 06:00) (92 - 108)  BP: 129/73 (10 Dec 2022 06:00) (129/73 - 145/84)  BP(mean): 99 (09 Dec 2022 14:00) (99 - 99)  RR: 17 (10 Dec 2022 06:00) (17 - 18)  SpO2: 100% (10 Dec 2022 06:00) (98% - 100%)    Parameters below as of 10 Dec 2022 06:00  Patient On (Oxygen Delivery Method): room air        Gen: NAD  Neuro: alert, answering qs appropriately, moves all extremities, tremors with outstretched hands+  HEENT: anicteric, moist oral mucosa  Neck: supple, no JVD elevation  Cards: RRR  Pulm: good inspiratory effort, CTAB  Abd: soft, NT/ND, BS+  Ext: no edema  Skin: warm, dry      acetaminophen     Tablet .. 650 milliGRAM(s) Oral every 6 hours PRN  aluminum hydroxide/magnesium hydroxide/simethicone Suspension 30 milliLiter(s) Oral every 4 hours PRN  folic acid 1 milliGRAM(s) Oral daily  LORazepam     Tablet 2 milliGRAM(s) Oral every 4 hours PRN  LORazepam   Injectable 2 milliGRAM(s) IV Push every 2 hours PRN  melatonin 3 milliGRAM(s) Oral at bedtime PRN  multivitamin 1 Tablet(s) Oral daily  ondansetron Injectable 4 milliGRAM(s) IV Push every 8 hours PRN  simethicone 80 milliGRAM(s) Chew three times a day PRN  thiamine IVPB 500 milliGRAM(s) IV Intermittent every 8 hours

## 2022-12-11 PROCEDURE — 99232 SBSQ HOSP IP/OBS MODERATE 35: CPT

## 2022-12-11 RX ORDER — PANTOPRAZOLE SODIUM 20 MG/1
40 TABLET, DELAYED RELEASE ORAL
Refills: 0 | Status: DISCONTINUED | OUTPATIENT
Start: 2022-12-11 | End: 2022-12-15

## 2022-12-11 RX ORDER — FAMOTIDINE 10 MG/ML
20 INJECTION INTRAVENOUS DAILY
Refills: 0 | Status: DISCONTINUED | OUTPATIENT
Start: 2022-12-11 | End: 2022-12-15

## 2022-12-11 RX ADMIN — Medication 30 MILLILITER(S): at 17:04

## 2022-12-11 RX ADMIN — Medication 30 MILLILITER(S): at 09:55

## 2022-12-11 RX ADMIN — Medication 650 MILLIGRAM(S): at 22:52

## 2022-12-11 RX ADMIN — FAMOTIDINE 20 MILLIGRAM(S): 10 INJECTION INTRAVENOUS at 11:43

## 2022-12-11 RX ADMIN — Medication 650 MILLIGRAM(S): at 10:25

## 2022-12-11 RX ADMIN — Medication 2 MILLIGRAM(S): at 09:55

## 2022-12-11 RX ADMIN — ONDANSETRON 4 MILLIGRAM(S): 8 TABLET, FILM COATED ORAL at 22:54

## 2022-12-11 RX ADMIN — Medication 650 MILLIGRAM(S): at 09:55

## 2022-12-11 RX ADMIN — Medication 3 MILLIGRAM(S): at 22:54

## 2022-12-11 RX ADMIN — Medication 1 MILLIGRAM(S): at 11:43

## 2022-12-11 RX ADMIN — Medication 1 TABLET(S): at 11:43

## 2022-12-12 LAB
ANION GAP SERPL CALC-SCNC: 8 MMOL/L — SIGNIFICANT CHANGE UP (ref 5–17)
BUN SERPL-MCNC: 7 MG/DL — SIGNIFICANT CHANGE UP (ref 7–18)
CALCIUM SERPL-MCNC: 10 MG/DL — SIGNIFICANT CHANGE UP (ref 8.4–10.5)
CHLORIDE SERPL-SCNC: 106 MMOL/L — SIGNIFICANT CHANGE UP (ref 96–108)
CO2 SERPL-SCNC: 26 MMOL/L — SIGNIFICANT CHANGE UP (ref 22–31)
CREAT SERPL-MCNC: 0.78 MG/DL — SIGNIFICANT CHANGE UP (ref 0.5–1.3)
EGFR: 124 ML/MIN/1.73M2 — SIGNIFICANT CHANGE UP
GLUCOSE SERPL-MCNC: 97 MG/DL — SIGNIFICANT CHANGE UP (ref 70–99)
HCT VFR BLD CALC: 45.2 % — SIGNIFICANT CHANGE UP (ref 39–50)
HGB BLD-MCNC: 15.1 G/DL — SIGNIFICANT CHANGE UP (ref 13–17)
MCHC RBC-ENTMCNC: 29.2 PG — SIGNIFICANT CHANGE UP (ref 27–34)
MCHC RBC-ENTMCNC: 33.4 GM/DL — SIGNIFICANT CHANGE UP (ref 32–36)
MCV RBC AUTO: 87.3 FL — SIGNIFICANT CHANGE UP (ref 80–100)
NRBC # BLD: 0 /100 WBCS — SIGNIFICANT CHANGE UP (ref 0–0)
PLATELET # BLD AUTO: 202 K/UL — SIGNIFICANT CHANGE UP (ref 150–400)
POTASSIUM SERPL-MCNC: 4.1 MMOL/L — SIGNIFICANT CHANGE UP (ref 3.5–5.3)
POTASSIUM SERPL-SCNC: 4.1 MMOL/L — SIGNIFICANT CHANGE UP (ref 3.5–5.3)
RBC # BLD: 5.18 M/UL — SIGNIFICANT CHANGE UP (ref 4.2–5.8)
RBC # FLD: 13.8 % — SIGNIFICANT CHANGE UP (ref 10.3–14.5)
SODIUM SERPL-SCNC: 140 MMOL/L — SIGNIFICANT CHANGE UP (ref 135–145)
WBC # BLD: 7.33 K/UL — SIGNIFICANT CHANGE UP (ref 3.8–10.5)
WBC # FLD AUTO: 7.33 K/UL — SIGNIFICANT CHANGE UP (ref 3.8–10.5)

## 2022-12-12 PROCEDURE — 99233 SBSQ HOSP IP/OBS HIGH 50: CPT

## 2022-12-12 RX ORDER — SIMETHICONE 80 MG/1
1 TABLET, CHEWABLE ORAL
Qty: 42 | Refills: 0
Start: 2022-12-12 | End: 2022-12-25

## 2022-12-12 RX ORDER — FAMOTIDINE 10 MG/ML
1 INJECTION INTRAVENOUS
Qty: 14 | Refills: 0
Start: 2022-12-12 | End: 2022-12-25

## 2022-12-12 RX ORDER — PANTOPRAZOLE SODIUM 20 MG/1
1 TABLET, DELAYED RELEASE ORAL
Qty: 14 | Refills: 0
Start: 2022-12-12 | End: 2022-12-25

## 2022-12-12 RX ORDER — FOLIC ACID 0.8 MG
1 TABLET ORAL
Qty: 14 | Refills: 0
Start: 2022-12-12 | End: 2022-12-25

## 2022-12-12 RX ORDER — ACETAMINOPHEN 500 MG
2 TABLET ORAL
Qty: 112 | Refills: 0
Start: 2022-12-12 | End: 2022-12-25

## 2022-12-12 RX ADMIN — FAMOTIDINE 20 MILLIGRAM(S): 10 INJECTION INTRAVENOUS at 11:24

## 2022-12-12 RX ADMIN — Medication 650 MILLIGRAM(S): at 22:03

## 2022-12-12 RX ADMIN — Medication 1 MILLIGRAM(S): at 11:23

## 2022-12-12 RX ADMIN — Medication 1 TABLET(S): at 11:23

## 2022-12-12 RX ADMIN — PANTOPRAZOLE SODIUM 40 MILLIGRAM(S): 20 TABLET, DELAYED RELEASE ORAL at 06:48

## 2022-12-12 RX ADMIN — Medication 650 MILLIGRAM(S): at 22:33

## 2022-12-12 NOTE — DISCHARGE NOTE PROVIDER - NSFOLLOWUPCLINICS_GEN_ALL_ED_FT
[facility]  Internal Medicine  [address] [place] [address]  Phone: [phone]  Fax: [phone]  Follow Up Time: 2 weeks

## 2022-12-12 NOTE — DISCHARGE NOTE PROVIDER - NSDCFUADDAPPT_GEN_ALL_CORE_FT
contact the [facility] by calling [phone].  [facility] Recovery Services: [phone]. [facility] -[phone]    Contact the [facility] by calling [phone].  [facility] Addiction Recovery Services: [phone]. [facility] -[phone]    Contact the [facility] by calling [phone].  [facility] Addiction Recovery Services: [phone]. [facility] -[phone]    [facility]  Address: [address] [place] [address]  Phone: [phone]

## 2022-12-12 NOTE — DISCHARGE NOTE PROVIDER - NSDCCPCAREPLAN_GEN_ALL_CORE_FT
PRINCIPAL DISCHARGE DIAGNOSIS  Diagnosis: Alcohol dependence with withdrawal  Assessment and Plan of Treatment: You were admitted to the hospital for alcohol withdrawal symptoms, you were started on medications to manage withdrawal symptoms.  Your symptoms remained stable.   Continue medications as prescribed   Follow up outpatient MD for rouine follow up.   In order to optimize your overall health and prevent adverse events, please abstain from ingesting alcohol. Continue to supplement with recommended vitamins and follow-up with your primary care provider for further medical care. It is highly recommended to attend AA meetings to help create a sober lifestyle. If you need immediate assistance with substance abuse you may contact the [facility] by calling [phone].  [facility] Addiction Recovery Services: [phone]. [facility] -[phone]           SECONDARY DISCHARGE DIAGNOSES  Diagnosis: Transaminitis  Assessment and Plan of Treatment: You were liever enzymes were low, likely chronic condition in the setting with alcohol use. Avoid hepatotoxic medications and alcohol consumption as possible. Follow up with outpatient clinic to monitor.     PRINCIPAL DISCHARGE DIAGNOSIS  Diagnosis: Alcohol dependence with withdrawal  Assessment and Plan of Treatment: You were admitted to the hospital for alcohol withdrawal symptoms, you were started on medications to manage withdrawal symptoms.  Your symptoms remained stable.   Continue medications as prescribed   Follow up outpatient MD for rouine follow up.   In order to optimize your overall health and prevent adverse events, please abstain from ingesting alcohol. Continue to supplement with recommended vitamins and follow-up with your primary care provider for further medical care. It is highly recommended to attend AA meetings to help create a sober lifestyle. If you need immediate assistance with substance abuse you may contact the [facility] by calling [phone].  [facility] Addiction Recovery Services: [phone]. [facility] -[phone].           SECONDARY DISCHARGE DIAGNOSES  Diagnosis: Transaminitis  Assessment and Plan of Treatment: You were liever enzymes were low, likely chronic condition in the setting with alcohol use. Avoid hepatotoxic medications and alcohol consumption as possible. Follow up with outpatient clinic to monitor.     PRINCIPAL DISCHARGE DIAGNOSIS  Diagnosis: Alcohol dependence with withdrawal  Assessment and Plan of Treatment: You were admitted to the hospital for alcohol withdrawal symptoms, you were started on medications to manage withdrawal symptoms.  Your symptoms remained stable.   Continue medications as prescribed   Follow up outpatient MD for rouine follow up.   In order to optimize your overall health and prevent adverse events, please abstain from ingesting alcohol. Continue to supplement with recommended vitamins and follow-up with your primary care provider for further medical care. It is highly recommended to attend AA meetings to help create a sober lifestyle. If you need immediate assistance with substance abuse you may contact the [facility] by calling [phone].  [facility] Addiction Recovery Services: [phone]. [facility] -[phone].           SECONDARY DISCHARGE DIAGNOSES  Diagnosis: Transaminitis  Assessment and Plan of Treatment: You were liever enzymes were low, likely chronic condition in the setting with alcohol use. Avoid hepatotoxic medications such as tylenol and alcohol consumption as possible. Follow up with outpatient clinic to monitor.     PRINCIPAL DISCHARGE DIAGNOSIS  Diagnosis: Alcohol dependence with withdrawal  Assessment and Plan of Treatment: You were admitted to the hospital for alcohol withdrawal symptoms, you were started on medications to manage withdrawal symptoms.  Your symptoms remained stable.   Continue medications as prescribed   Follow up outpatient MD for routine follow up.   In order to optimize your overall health and prevent adverse events, please abstain from ingesting alcohol. Continue to supplement with recommended vitamins and follow-up with your primary care provider for further medical care. It is highly recommended to attend AA meetings to help create a sober lifestyle. If you need immediate assistance with substance abuse you may contact the [facility] by calling [phone].  [facility] Addiction Recovery Services: [phone]. [facility] -[phone].           SECONDARY DISCHARGE DIAGNOSES  Diagnosis: Transaminitis  Assessment and Plan of Treatment: You were liver enzymes were low, likely chronic in the setting ofalcohol use. Avoid hepatotoxic medications such as tylenol, and abstain from alcohol consumption as much as possible. Follow up with outpatient clinic to monitor.

## 2022-12-12 NOTE — DISCHARGE NOTE PROVIDER - HOSPITAL COURSE
This is a 27 year old male, coming from home primarily speaking with Luis, with medical history of alcohol abuse who p/w chest pain, anxiety and shaking. Pt states these symptoms feel similar to the last time he went into withdrawal from alcohol use. His last drink was > 24 hours prior to admission. Admitted for ETOH withdrawal, started on CIWA protocol with prn Ativan, thiamin, Vt B supplement.  Pt is also noted for transaminitis, likely chronic due to ETOH. RUQ abdominal US resulted fatty liver.   PT evaluated patient and there no PT needs. Patient is medically optimized for discharge, SW provided all resources for drop-in shelter and ETOH clinics. VIVO meds were sent.   Pleaser refer to medical records/progress notes for in depth hospital course.       This is a 27 year old male, coming from home primarily speaking with Luis, with medical history of alcohol abuse who p/w chest pain, anxiety and shaking. Pt states these symptoms feel similar to the last time he went into withdrawal from alcohol use. His last drink was > 24 hours prior to admission. Admitted for ETOH withdrawal, started on CIWA protocol with prn Ativan, thiamin, Vt B supplement.  Pt is also noted for transaminitis, likely chronic due to ETOH. RUQ abdominal US resulted fatty liver.   PT evaluated patient and there no PT needs. Patient is medically optimized for discharge, SW provided all resources for drop-in shelter and ETOH clinics. VIVO meds were sent.   Pleaser refer to medical records/progress notes for in depth hospital course.      12/14- Pt evaluated at bedside and resting comfortably in bed w/o acute medical complaints at time of eval.    D/C planning discussed with at length in explicit details with understanding verbalized. This is a 27 year old male, coming from home primarily speaking with Luis, with medical history of alcohol abuse who p/w chest pain, anxiety and shaking. Pt states these symptoms feel similar to the last time he went into withdrawal from alcohol use. His last drink was > 24 hours prior to admission. Admitted for ETOH withdrawal, started on CIWA protocol with prn Ativan, thiamin, Vt B supplement.  Pt is also noted for transaminitis, likely chronic due to ETOH. RUQ abdominal US resulted fatty liver.   PT evaluated patient and there no PT needs. Patient is medically optimized for discharge, SW provided all resources for drop-in shelter and ETOH clinics. VIVO meds were sent.   Pleaser refer to medical records/progress notes for in depth hospital course.      12/14- Pt evaluated at bedside and resting comfortably in bed w/o acute medical complaints at time of eval.    D/C planning discussed with at length in explicit details with understanding verbalized. [place]  #[id number]

## 2022-12-12 NOTE — DISCHARGE NOTE PROVIDER - NSDCMRMEDTOKEN_GEN_ALL_CORE_FT
acetaminophen 325 mg oral tablet: 2 tab(s) orally every 6 hours, As Needed -Temp greater or equal to 38C (100.4F), Mild Pain (1 - 3) - for moderate pain   famotidine 20 mg oral tablet: 1 tab(s) orally once a day  folic acid 1 mg oral tablet: 1 tab(s) orally once a day  Multiple Vitamins oral tablet: 1 tab(s) orally once a day  pantoprazole 40 mg oral delayed release tablet: 1 tab(s) orally once a day (before a meal)  simethicone 80 mg oral tablet, chewable: 1 tab(s) orally 3 times a day, As needed, Gas   famotidine 20 mg oral tablet: 1 tab(s) orally once a day  folic acid 1 mg oral tablet: 1 tab(s) orally once a day  Multiple Vitamins oral tablet: 1 tab(s) orally once a day  pantoprazole 40 mg oral delayed release tablet: 1 tab(s) orally once a day (before a meal)  simethicone 80 mg oral tablet, chewable: 1 tab(s) orally 3 times a day, As needed, Gas

## 2022-12-12 NOTE — DISCHARGE NOTE PROVIDER - ATTENDING DISCHARGE PHYSICAL EXAMINATION:
Gen: NAD  Neuro: alert, answering qs appropriately, moves all extremities  HEENT: anicteric, moist oral mucosa  Neck: supple, no JVD elevation  Cards: RRR  Pulm: good inspiratory effort, CTAB  Abd: soft, NT/ND, BS+  Ext: no edema  Skin: warm, dry   Constitutional/General: Well developed, vitals reviewed  EYE: Symmetrical pupils, conjunctiva clear   ENT: Good dentition, oropharynx clear  NECK: No visual masses, no JVD  CHEST: No respiratory distress, bilateral symmetrical chest rise  ABDOMEN: Nondistended, no visual masses  SKIN: No rash, warm, dry  NEURO: Cranial nerves grossly intact, moves all extremities, follows commands  PSYCH: A+Ox3, normal mood and affect

## 2022-12-13 PROCEDURE — 99232 SBSQ HOSP IP/OBS MODERATE 35: CPT

## 2022-12-13 RX ORDER — ENOXAPARIN SODIUM 100 MG/ML
40 INJECTION SUBCUTANEOUS EVERY 24 HOURS
Refills: 0 | Status: DISCONTINUED | OUTPATIENT
Start: 2022-12-13 | End: 2022-12-15

## 2022-12-13 RX ADMIN — Medication 1 MILLIGRAM(S): at 12:30

## 2022-12-13 RX ADMIN — PANTOPRAZOLE SODIUM 40 MILLIGRAM(S): 20 TABLET, DELAYED RELEASE ORAL at 05:00

## 2022-12-13 RX ADMIN — Medication 650 MILLIGRAM(S): at 20:44

## 2022-12-13 RX ADMIN — Medication 650 MILLIGRAM(S): at 19:50

## 2022-12-13 RX ADMIN — Medication 1 TABLET(S): at 12:30

## 2022-12-13 RX ADMIN — FAMOTIDINE 20 MILLIGRAM(S): 10 INJECTION INTRAVENOUS at 12:30

## 2022-12-13 NOTE — DIETITIAN INITIAL EVALUATION ADULT - OTHER INFO
Patient homeless admitted for ETOH gastritis & withdrawals. Visited pt. alert & Luis speaking,  - ID#[id number], per pt. presently having "mild nausea & abdominal pain, also states in English consumes "Vegetarian diet", obtained food preferences, accepts dairy/products, agreed to nutrition supplement, prefers vanilla flavor, update kitchen/Diet Tech. In addition pt. UBW >200 Lbs & possible for weight loss? dosing wt. 238.9 Lbs on 12/07/22. Per flow sheet 51-75%, awaiting Shelter placement,  consulted.

## 2022-12-13 NOTE — DIETITIAN INITIAL EVALUATION ADULT - FACTORS AFF FOOD INTAKE
abdominal pain, ETOH withdrawals, homeless, s/p US abdomin shows "Fatty Liver"/difficulty with food procurement/preparation/persistent nausea/Buddhism/ethnic/cultural/personal food preferences/vomiting/other (specify)

## 2022-12-13 NOTE — PROGRESS NOTE ADULT - SUBJECTIVE AND OBJECTIVE BOX
NP Note discussed with  Primary Attending    Patient is a 27y old  Male who presents with a chief complaint of Alcohol dependence with withdrawal     (13 Dec 2022 16:09)      INTERVAL HPI/OVERNIGHT EVENTS: no new complaints    MEDICATIONS  (STANDING):  enoxaparin Injectable 40 milliGRAM(s) SubCutaneous every 24 hours  famotidine    Tablet 20 milliGRAM(s) Oral daily  folic acid 1 milliGRAM(s) Oral daily  multivitamin 1 Tablet(s) Oral daily  pantoprazole    Tablet 40 milliGRAM(s) Oral before breakfast    MEDICATIONS  (PRN):  acetaminophen     Tablet .. 650 milliGRAM(s) Oral every 6 hours PRN Temp greater or equal to 38C (100.4F), Mild Pain (1 - 3)  aluminum hydroxide/magnesium hydroxide/simethicone Suspension 30 milliLiter(s) Oral every 4 hours PRN Dyspepsia  LORazepam     Tablet 2 milliGRAM(s) Oral every 4 hours PRN CIWA-Ar score 8 or greater  LORazepam   Injectable 2 milliGRAM(s) IV Push every 2 hours PRN CIWA-Ar score increase by 2 points and a total score of 7 or less  melatonin 3 milliGRAM(s) Oral at bedtime PRN Insomnia  ondansetron Injectable 4 milliGRAM(s) IV Push every 8 hours PRN Nausea and/or Vomiting  simethicone 80 milliGRAM(s) Chew three times a day PRN Gas      __________________________________________________  REVIEW OF SYSTEMS:    CONSTITUTIONAL: No fever,   EYES: no acute visual disturbances  NECK: No pain or stiffness  RESPIRATORY: No cough; No shortness of breath  CARDIOVASCULAR: No chest pain, no palpitations  GASTROINTESTINAL: No pain. No nausea or vomiting; No diarrhea   NEUROLOGICAL: No headache or numbness, no tremors  MUSCULOSKELETAL: No joint pain, no muscle pain  GENITOURINARY: no dysuria, no frequency, no hesitancy  PSYCHIATRY: no depression , no anxiety  ALL OTHER  ROS negative        Vital Signs Last 24 Hrs  T(C): 37.2 (13 Dec 2022 14:43), Max: 37.2 (12 Dec 2022 21:03)  T(F): 98.9 (13 Dec 2022 14:43), Max: 99 (12 Dec 2022 21:03)  HR: 87 (13 Dec 2022 14:43) (80 - 95)  BP: 121/69 (13 Dec 2022 14:43) (119/81 - 126/75)  BP(mean): --  RR: 17 (13 Dec 2022 14:43) (17 - 18)  SpO2: 98% (13 Dec 2022 14:43) (97% - 99%)    Parameters below as of 13 Dec 2022 14:43  Patient On (Oxygen Delivery Method): room air        ________________________________________________  PHYSICAL EXAM:  GENERAL: NAD  HEENT: Normocephalic;  conjunctivae and sclerae clear; moist mucous membranes;   NECK : supple  CHEST/LUNG: Clear to auscultation bilaterally with good air entry   HEART: S1 S2  regular; no murmurs, gallops or rubs  ABDOMEN: Soft, Nontender, Nondistended; Bowel sounds present  EXTREMITIES: no cyanosis; no edema; no calf tenderness  SKIN: warm and dry; no rash  NERVOUS SYSTEM:  Awake and alert; Oriented  to place, person and time ; no new deficits    _________________________________________________  LABS:                        15.1   7.33  )-----------( 202      ( 12 Dec 2022 05:15 )             45.2     12-12    140  |  106  |  7   ----------------------------<  97  4.1   |  26  |  0.78    Ca    10.0      12 Dec 2022 05:15          CAPILLARY BLOOD GLUCOSE    RADIOLOGY & ADDITIONAL TESTS:    Imaging Personally Reviewed:  YES/NO    Consultant(s) Notes Reviewed:   YES/ No    Care Discussed with Consultants :     Plan of care was discussed with patient and /or primary care giver; all questions and concerns were addressed and care was aligned with patient's wishes.

## 2022-12-13 NOTE — PROGRESS NOTE ADULT - PROBLEM SELECTOR PLAN 1
- drinks daily  - last drink 2 days prior to admission, reports he has been trying to cut alcohol last week  - BAL <3 on admission  - CIWA 0 at this time   - Last PRN Ativan 10/11  - Thiamine 500mg q8 x3 days completed  - Cont  multivitamin, b12 qd, b9 qd  - SW following shelter placement
- drinks daily  - last drink 2 days ago, reports he has been trying to cut alcohol last week  - BAL <3 on admission  - CIWA 2 this AM 12/9  - no PRN Ativan given in 24 hours  - Thiamine 500mg q8 x3 days, multivitamin, b12 qd, b9 qd  - [facility] consulted

## 2022-12-13 NOTE — DIETITIAN INITIAL EVALUATION ADULT - PERTINENT MEDS FT
MEDICATIONS  (STANDING):  famotidine    Tablet 20 milliGRAM(s) Oral daily  folic acid 1 milliGRAM(s) Oral daily  multivitamin 1 Tablet(s) Oral daily  pantoprazole    Tablet 40 milliGRAM(s) Oral before breakfast    MEDICATIONS  (PRN):  acetaminophen     Tablet .. 650 milliGRAM(s) Oral every 6 hours PRN Temp greater or equal to 38C (100.4F), Mild Pain (1 - 3)  aluminum hydroxide/magnesium hydroxide/simethicone Suspension 30 milliLiter(s) Oral every 4 hours PRN Dyspepsia  LORazepam     Tablet 2 milliGRAM(s) Oral every 4 hours PRN CIWA-Ar score 8 or greater  LORazepam   Injectable 2 milliGRAM(s) IV Push every 2 hours PRN CIWA-Ar score increase by 2 points and a total score of 7 or less  melatonin 3 milliGRAM(s) Oral at bedtime PRN Insomnia  ondansetron Injectable 4 milliGRAM(s) IV Push every 8 hours PRN Nausea and/or Vomiting  simethicone 80 milliGRAM(s) Chew three times a day PRN Gas

## 2022-12-13 NOTE — PROGRESS NOTE ADULT - PROBLEM SELECTOR PLAN 4
- patient is undomiciled  - [facility] provided patient list of drop-in shelters, and ETOH withdrawal clinics  - Shelter placement pending auth  - VIVO meds were ordered 12/12
- patient is undomiciled  -  provided patient list of drop-in shelters, and ETOH withdrawal clinics  - patient requesting to leave in AM  - 24 hour notice given

## 2022-12-13 NOTE — DIETITIAN INITIAL EVALUATION ADULT - PERTINENT LABORATORY DATA
12-12    140  |  106  |  7   ----------------------------<  97  4.1   |  26  |  0.78    Ca    10.0      12 Dec 2022 05:15    A1C with Estimated Average Glucose Result: 5.0 % (12-08-22 @ 05:40)

## 2022-12-13 NOTE — PROGRESS NOTE ADULT - SUBJECTIVE AND OBJECTIVE BOX
S: Patient seen and examined at bedside. No acute events overnight. States he has a headache this morning.     REVIEW OF SYSTEMS:  CONSTITUTIONAL: No weakness, fevers or chills  EYES: No visual changes  ENT: No vertigo or throat pain   NECK: No pain or stiffness  RESPIRATORY: No cough, wheezing, hemoptysis; No shortness of breath  CARDIOVASCULAR: No chest pain or palpitations  GASTROINTESTINAL: No abdominal or epigastric pain. No nausea, vomiting, or hematemesis; No diarrhea or constipation. No melena or hematochezia.  GENITOURINARY: No dysuria, frequency or hematuria  NEUROLOGICAL: No numbness or weakness  SKIN: No itching, rashes  PSYCH: No depression, anxiety    O:  Vital Signs Last 24 Hrs  T(C): 37.2 (13 Dec 2022 14:43), Max: 37.2 (12 Dec 2022 21:03)  T(F): 98.9 (13 Dec 2022 14:43), Max: 99 (12 Dec 2022 21:03)  HR: 87 (13 Dec 2022 14:43) (80 - 95)  BP: 121/69 (13 Dec 2022 14:43) (119/81 - 126/75)  BP(mean): --  RR: 17 (13 Dec 2022 14:43) (17 - 18)  SpO2: 98% (13 Dec 2022 14:43) (97% - 99%)    Parameters below as of 13 Dec 2022 14:43  Patient On (Oxygen Delivery Method): room air    Constitutional/General: Well developed, vitals reviewed  EYE: Symmetrical pupils, conjunctiva clear   ENT: Good dentition, oropharynx clear  NECK: No visual masses, no JVD  CHEST: No respiratory distress, bilateral symmetrical chest rise  ABDOMEN: Nondistended, no visual masses  SKIN: No rash, warm, dry  NEURO: Cranial nerves grossly intact, moves all extremities, follows commands. Mild bilateral upper extremity tremor.   PSYCH: A+Ox3, normal mood and affect    acetaminophen     Tablet .. 650 milliGRAM(s) Oral every 6 hours PRN  aluminum hydroxide/magnesium hydroxide/simethicone Suspension 30 milliLiter(s) Oral every 4 hours PRN  famotidine    Tablet 20 milliGRAM(s) Oral daily  folic acid 1 milliGRAM(s) Oral daily  LORazepam     Tablet 2 milliGRAM(s) Oral every 4 hours PRN  LORazepam   Injectable 2 milliGRAM(s) IV Push every 2 hours PRN  melatonin 3 milliGRAM(s) Oral at bedtime PRN  multivitamin 1 Tablet(s) Oral daily  ondansetron Injectable 4 milliGRAM(s) IV Push every 8 hours PRN  pantoprazole    Tablet 40 milliGRAM(s) Oral before breakfast  simethicone 80 milliGRAM(s) Chew three times a day PRN                            15.1   7.33  )-----------( 202      ( 12 Dec 2022 05:15 )             45.2       12-12    140  |  106  |  7   ----------------------------<  97  4.1   |  26  |  0.78    Ca    10.0      12 Dec 2022 05:15

## 2022-12-14 LAB — SARS-COV-2 RNA SPEC QL NAA+PROBE: SIGNIFICANT CHANGE UP

## 2022-12-14 PROCEDURE — 99239 HOSP IP/OBS DSCHRG MGMT >30: CPT

## 2022-12-14 RX ADMIN — FAMOTIDINE 20 MILLIGRAM(S): 10 INJECTION INTRAVENOUS at 12:53

## 2022-12-14 RX ADMIN — Medication 650 MILLIGRAM(S): at 19:01

## 2022-12-14 RX ADMIN — Medication 650 MILLIGRAM(S): at 17:12

## 2022-12-14 RX ADMIN — SIMETHICONE 80 MILLIGRAM(S): 80 TABLET, CHEWABLE ORAL at 12:53

## 2022-12-14 RX ADMIN — Medication 1 TABLET(S): at 12:52

## 2022-12-14 RX ADMIN — PANTOPRAZOLE SODIUM 40 MILLIGRAM(S): 20 TABLET, DELAYED RELEASE ORAL at 05:41

## 2022-12-14 RX ADMIN — ENOXAPARIN SODIUM 40 MILLIGRAM(S): 100 INJECTION SUBCUTANEOUS at 05:41

## 2022-12-14 RX ADMIN — Medication 1 MILLIGRAM(S): at 12:52

## 2022-12-14 NOTE — PHARMACOTHERAPY INTERVENTION NOTE - COMMENTS
Provided education on medications received from [facility].  Used Luis  services (Brooke ID# [id number]).  Pt showed understanding and had no questions.

## 2022-12-14 NOTE — DISCHARGE NOTE NURSING/CASE MANAGEMENT/SOCIAL WORK - NSDCFUADDAPPT_GEN_ALL_CORE_FT
Contact the [facility] by calling [phone].  [facility]: [phone]. [facility] -[phone]

## 2022-12-14 NOTE — DISCHARGE NOTE NURSING/CASE MANAGEMENT/SOCIAL WORK - NSDCPEFALRISK_GEN_ALL_CORE
For information on Fall & Injury Prevention, visit: [email or web address] OR  [email or web address] OR  https://www.cdc.gov/steadi/patient.html

## 2022-12-14 NOTE — DISCHARGE NOTE NURSING/CASE MANAGEMENT/SOCIAL WORK - PATIENT PORTAL LINK FT
You can access the FollowMyHealth Patient Portal offered by [facility] by registering at the following website: [email or web address]. By joining [facility]’s FollowMyHealth portal, you will also be able to view your health information using other applications (apps) compatible with our system.

## 2022-12-15 VITALS
HEART RATE: 94 BPM | DIASTOLIC BLOOD PRESSURE: 74 MMHG | TEMPERATURE: 99 F | RESPIRATION RATE: 16 BRPM | OXYGEN SATURATION: 98 % | SYSTOLIC BLOOD PRESSURE: 119 MMHG

## 2022-12-15 PROCEDURE — 99232 SBSQ HOSP IP/OBS MODERATE 35: CPT

## 2022-12-15 PROCEDURE — 80053 COMPREHEN METABOLIC PANEL: CPT

## 2022-12-15 PROCEDURE — 87635 SARS-COV-2 COVID-19 AMP PRB: CPT

## 2022-12-15 PROCEDURE — 85027 COMPLETE CBC AUTOMATED: CPT

## 2022-12-15 PROCEDURE — 99285 EMERGENCY DEPT VISIT HI MDM: CPT | Mod: 25

## 2022-12-15 PROCEDURE — 36415 COLL VENOUS BLD VENIPUNCTURE: CPT

## 2022-12-15 PROCEDURE — 83036 HEMOGLOBIN GLYCOSYLATED A1C: CPT

## 2022-12-15 PROCEDURE — 80307 DRUG TEST PRSMV CHEM ANLYZR: CPT

## 2022-12-15 PROCEDURE — 83690 ASSAY OF LIPASE: CPT

## 2022-12-15 PROCEDURE — 97162 PT EVAL MOD COMPLEX 30 MIN: CPT

## 2022-12-15 PROCEDURE — 81003 URINALYSIS AUTO W/O SCOPE: CPT

## 2022-12-15 PROCEDURE — 96374 THER/PROPH/DIAG INJ IV PUSH: CPT

## 2022-12-15 PROCEDURE — 80048 BASIC METABOLIC PNL TOTAL CA: CPT

## 2022-12-15 PROCEDURE — 93005 ELECTROCARDIOGRAM TRACING: CPT

## 2022-12-15 PROCEDURE — 76705 ECHO EXAM OF ABDOMEN: CPT

## 2022-12-15 PROCEDURE — 85025 COMPLETE CBC W/AUTO DIFF WBC: CPT

## 2022-12-15 PROCEDURE — 71045 X-RAY EXAM CHEST 1 VIEW: CPT

## 2022-12-15 PROCEDURE — 87637 SARSCOV2&INF A&B&RSV AMP PRB: CPT

## 2022-12-15 PROCEDURE — 80061 LIPID PANEL: CPT

## 2022-12-15 PROCEDURE — 82962 GLUCOSE BLOOD TEST: CPT

## 2022-12-15 RX ADMIN — PANTOPRAZOLE SODIUM 40 MILLIGRAM(S): 20 TABLET, DELAYED RELEASE ORAL at 05:18

## 2022-12-15 RX ADMIN — FAMOTIDINE 20 MILLIGRAM(S): 10 INJECTION INTRAVENOUS at 12:21

## 2022-12-15 RX ADMIN — SIMETHICONE 80 MILLIGRAM(S): 80 TABLET, CHEWABLE ORAL at 12:21

## 2022-12-15 RX ADMIN — Medication 1 MILLIGRAM(S): at 12:21

## 2022-12-15 RX ADMIN — Medication 1 TABLET(S): at 12:21

## 2022-12-15 NOTE — PROGRESS NOTE ADULT - PROVIDER SPECIALTY LIST ADULT
Internal Medicine
Hospitalist
Hospitalist
Internal Medicine
Internal Medicine

## 2022-12-15 NOTE — PROGRESS NOTE ADULT - SUBJECTIVE AND OBJECTIVE BOX
S: Patient seen and examined at bedside. No acute events overnight. Unable to arrange  last night, patient anticipating discharge today. Denies any new complaints.     REVIEW OF SYSTEMS:  CONSTITUTIONAL: No weakness, fevers or chills  EYES: No visual changes  ENT: No vertigo or throat pain   NECK: No pain or stiffness  RESPIRATORY: No cough, wheezing, hemoptysis; No shortness of breath  CARDIOVASCULAR: No chest pain or palpitations  GASTROINTESTINAL: No abdominal or epigastric pain. No nausea, vomiting, or hematemesis; No diarrhea or constipation. No melena or hematochezia.  GENITOURINARY: No dysuria, frequency or hematuria  NEUROLOGICAL: No numbness or weakness  SKIN: No itching, rashes  PSYCH: No depression, anxiety    O:  Vital Signs Last 24 Hrs  T(C): 37 (15 Dec 2022 13:04), Max: 37.1 (14 Dec 2022 20:46)  T(F): 98.6 (15 Dec 2022 13:04), Max: 98.7 (14 Dec 2022 20:46)  HR: 94 (15 Dec 2022 13:04) (80 - 94)  BP: 119/74 (15 Dec 2022 13:04) (119/74 - 128/70)  BP(mean): 85 (15 Dec 2022 13:04) (85 - 85)  RR: 16 (15 Dec 2022 13:04) (16 - 16)  SpO2: 98% (15 Dec 2022 13:04) (97% - 100%)    Parameters below as of 15 Dec 2022 13:04  Patient On (Oxygen Delivery Method): room air    Constitutional/General: Well developed, vitals reviewed  EYE: Symmetrical pupils, conjunctiva clear   ENT: Good dentition, oropharynx clear  NECK: No visual masses, no JVD  CHEST: No respiratory distress, bilateral symmetrical chest rise  ABDOMEN: Nondistended, no visual masses  SKIN: No rash, warm, dry  NEURO: Cranial nerves grossly intact, moves all extremities, follows commands  PSYCH: A+Ox3, normal mood and affect    acetaminophen     Tablet .. 650 milliGRAM(s) Oral every 6 hours PRN  aluminum hydroxide/magnesium hydroxide/simethicone Suspension 30 milliLiter(s) Oral every 4 hours PRN  enoxaparin Injectable 40 milliGRAM(s) SubCutaneous every 24 hours  famotidine    Tablet 20 milliGRAM(s) Oral daily  folic acid 1 milliGRAM(s) Oral daily  LORazepam     Tablet 2 milliGRAM(s) Oral every 4 hours PRN  LORazepam   Injectable 2 milliGRAM(s) IV Push every 2 hours PRN  melatonin 3 milliGRAM(s) Oral at bedtime PRN  multivitamin 1 Tablet(s) Oral daily  ondansetron Injectable 4 milliGRAM(s) IV Push every 8 hours PRN  pantoprazole    Tablet 40 milliGRAM(s) Oral before breakfast  simethicone 80 milliGRAM(s) Chew three times a day PRN

## 2022-12-15 NOTE — PROGRESS NOTE ADULT - ASSESSMENT
#Alcohol withdrawal 2/2 alcohol abuse   #Transaminitis  #HLD  #Gastritis 2/2 alcohol abuse  #Homeless     27y homeless male with PMH of alcohol abuse admitted with alcohol withdrawal. Currently scoring 1-4 on CIWA. Reports drinking 1-2 bottles of whiskey daily. US RUQ showed fatty liver, advised low fat diet. Continue PPI and famotidine for alcohol induced gastritis. Patient waiting for placement at shelter, anticipated this afternoon. DVT ppx: Lovenox.   
#Alcohol withdrawal 2/2 alcohol abuse   #Transaminitis  #HLD  #Gastritis 2/2 alcohol abuse  #Homeless     27y homeless male with PMH of alcohol abuse admitted with alcohol withdrawal. Currently scoring 1-4 on CIWA. Reports drinking 1-2 bottles of whiskey daily. US RUQ showed fatty liver, advised low fat diet. Continue PPI and famotidine for alcohol induced gastritis. Patient waiting for placement at shelter, anticipated dc 24h. DVT ppx: Lovenox. 
26 yo male, Luis speaking, homeless, with PMH of alcohol abuse coming in for alcohol intoxication/withdrawal.       #Etoh abuse/withdrawal  #Fatty liver  #Elevated liver enzyme  #HLD  #homeless  #gastritis    - On PRN ativan. CIWA 1-4   - last drink 2 days before admission, drinks 1/2 bottle of whiskey/ day. Pt is willing to stop alcohol. f/u SW    - reports burning epigastric pain, likely etoh induced gastritis  - CIWA protocol, Ativan 2mg q4 standing + 2mg q2 PRN, iv thiamine folate replacement  - US abd : fatty liver. diet modification and weight loss  - Dash diet  - Pt states he is homeless - SW provided pt with shelter info - pt unwilling to go home today, complains of gastritis - start PPI and given 24h notice
28 yo male, Luis speaking, homeless, with PMH of alcohol abuse coming in for alcohol intoxication/withdrawal.       #Etoh abuse/withdrawal  #Fatty liver  #Elevated liver enzyme  #HLD  #homeless    - On PRN ativan. CIWA 1-4   - last drink 2 days before admission, drinks 1/2 bottle of whiskey/ day. Pt is willing to stop alcohol. f/u SW    - reports burning epigastric pain, likely etoh induced gastritis  - CIWA protocol, Ativan 2mg q4 standing + 2mg q2 PRN, iv thiamine folate replacement  - US abd : fatty liver. diet modification and weight loss  - Dash diet  - Pt states he is homeless -  provided pt with shelter info - if pt willing to go to shelter on his own, we will d/c
28 yo male, Luis speaking, homeless, with PMH of alcohol abuse coming in for alcohol intoxication/withdrawal.       #Etoh abuse/withdrawal  #Fatty liver  #Elevated liver enzyme  #HLD  #homeless  #gastritis    - On PRN ativan. CIWA 1-4   - last drink 2 days before admission, drinks 1/2 bottle of whiskey/ day. Pt is willing to stop alcohol. f/u SW    - reports burning epigastric pain, likely etoh induced gastritis  - No longer scoring on CIWA, c/w iv thiamine folate replacement  - US abd : fatty liver. diet modification and weight loss  - Dash diet  - Pt states he is homeless - pending shelter plcement
This is a 27 year old male, coming from home primarily speaking with Luis, with medical history of alcohol abuse who p/w chest pain, anxiety and shaking. Pt states these symptoms feel similar to the last time he went into withdrawal from alcohol use. His last drink was > 24 hours prior to admission. Admitted for ETOH withdrawal, started on CIWA protocol, has not required PRN ativan today. PT evaluated patient and there no PT needs. Patient is medically optimized for discharge, SW provided all resources for drop-in shelter and ETOH clinics.  Forms for shelter application filled. awaiting auth.
This is a 27 year old male, coming from home primarily speaking with Luis, with medical history of alcohol abuse who p/w chest pain, anxiety and shaking. Pt states these symptoms feel similar to the last time he went into withdrawal from alcohol use. His last drink was > 24 hours prior to admission. Admitted for ETOH withdrawal, started on CIWA protocol, has not required PRN ativan today. PT evaluated patient and there no PT needs. Patient is medically optimized for discharge, SW provided all resources for drop-in shelter and ETOH clinics. Patient requesting to leave in AM, 24 hour notice provided to patient.

## 2022-12-15 NOTE — PROGRESS NOTE ADULT - REASON FOR ADMISSION
Alcohol intoxication

## 2023-04-24 NOTE — ED ADULT NURSE NOTE - OBJECTIVE STATEMENT
found on floor c/o epigastric pain radiating to chest , nhan speaking
pt was discharged from [facility] 4/15/23  after 5 day stay for anemia, received blood transfusions. Pt states was instructed to follow up with GI MD but unable to get an appointment. Pt returning today because he is concerned about his blood count. Denies any fatigue, dizziness or SOB. PMHX BPH, chronic back pain.

## 2023-07-12 NOTE — ED ADULT TRIAGE NOTE - WEIGHT METHOD
July 12, 2023      [facility]aco Ponce  [address]  [id number]  [place] [address]        To Whom It May Concern:    Paco Ponce was seen in our clinic. He may return to work on 7/17/2023 without restrictions. Please contact our clinic with any questions or concerns.      Sincerely,        Douglas Longo, DO           actual

## 2024-09-08 NOTE — ED PROVIDER NOTE - NEUROLOGICAL, MLM
Continue home Cymbalta and Klonopin  We will discontinue as needed trazodone due to patient being somnolent   Alert and oriented, no focal deficits, no motor or sensory deficits.

## 2025-02-24 NOTE — ED PROVIDER NOTE - CHIEF COMPLAINT
Detail Level: Generalized
Patient Specific Counseling (Will Not Stick From Patient To Patient): -Recommended tx options: topical or oral anti-fungal medication.\\n-Pt opted to treat affected nails with topical ciclopirox solution QD and was given sample of Jublia at visit today.\\n-All risks and side effects reviewed.\\n-Pt to RTC if not resolving in 6 months, will likely consider oral anti-fungal.
Detail Level: Detailed
The patient is a 27y Male complaining of altered mental status.